# Patient Record
(demographics unavailable — no encounter records)

---

## 2017-02-03 NOTE — MR
MRI Lower Extremity, Left Knee



History: Knee pain. Unable to straighten knee. History of prior surgery.



Comparison: None.



Technique: MRI was performed of the left knee using a 1.5 [employer] MRI system. Axial, sagittal, and jelani
nal images were obtained with standard imaging sequences. Patient could not straighten knee so a card
iac coil was used instead of the knee coil.



Findings:



General: Moderate suprapatellar joint effusion.



Ligaments and Tendons: Postsurgical changes are seen of prior ACL reconstruction with complete tear i
n the graft. Posterior cruciate ligament is intact. Medial collateral ligament is unremarkable. Edema
 along the distal pes anserinus. Iliotibial band, fibular collateral ligament, and biceps femoris are
 intact. Popliteus tendon is intact.



Menisci and Cartilage: Complex tear of the medial meniscus. Majority of the posterior horn and body i
s absent. Displaced meniscal fragment is seen in the superior meniscal recess between the medial pradeep
ateral ligament and medial tibial plateau measuring 1 cm. A displaced meniscal fragment is seen super
ior to the meniscal root measuring 16 mm. There is maceration and tear of the remaining anterior horn
 and body. Full-thickness cartilage loss is seen in the medial compartment more predominant in the ce
ntral and posterior weightbearing portion. There appears to be an undersurface tear in the anterior h
orn lateral meniscus with a perimeniscal cyst anterior to the anterior horn. No significant cartilage
 attenuation in the lateral compartment.



Extensor Mechanism: Cartilage signal abnormality and mild thinning are seen in the patella and trochl
ear groove. This is more predominant in the inferior trochlear groove. Quadriceps tendon is unremarka
ble. There is abnormal signal intensity in the patellar tendon indicating underlying tendinopathy and
 also defect from prior graft harvest.



Impression: 

1. Postsurgical changes of prior ACL reconstruction with complete tear of the graft.

2. Complex tear of the medial meniscus with a majority of the posterior horn and body displaced super
ior to the meniscal root and in the superior meniscal recess. Grade 4 articular cartilage disease med
ial compartment.

3. Undersurface tear anterior horn lateral meniscus and adjacent perimeniscal cyst.

4. Strain distal pes anserinus.

5. Suprapatellar joint effusion.

6. 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 Tendinopathy and postsurgical change
 patellar tendon.